# Patient Record
Sex: MALE | Race: WHITE
[De-identification: names, ages, dates, MRNs, and addresses within clinical notes are randomized per-mention and may not be internally consistent; named-entity substitution may affect disease eponyms.]

---

## 2017-09-16 ENCOUNTER — HOSPITAL ENCOUNTER (EMERGENCY)
Dept: HOSPITAL 18 - NAV ERS | Age: 82
Discharge: ANOTHER HOSPITAL | End: 2017-09-16
Payer: COMMERCIAL

## 2017-09-16 DIAGNOSIS — E11.9: ICD-10-CM

## 2017-09-16 DIAGNOSIS — R07.9: Primary | ICD-10-CM

## 2017-09-16 DIAGNOSIS — R42: ICD-10-CM

## 2017-09-16 DIAGNOSIS — Z79.899: ICD-10-CM

## 2017-09-16 DIAGNOSIS — I10: ICD-10-CM

## 2017-09-16 DIAGNOSIS — Z79.84: ICD-10-CM

## 2017-09-16 DIAGNOSIS — K21.9: ICD-10-CM

## 2017-09-16 LAB
ALBUMIN SERPL BCG-MCNC: 4.4 G/DL (ref 3.4–4.8)
ALP SERPL-CCNC: 45 U/L (ref 40–150)
ALT SERPL W P-5'-P-CCNC: 17 U/L (ref 8–55)
ANION GAP SERPL CALC-SCNC: 15 MMOL/L (ref 10–20)
AST SERPL-CCNC: 20 U/L (ref 5–34)
BASOPHILS # BLD AUTO: 0.1 THOU/UL (ref 0–0.2)
BASOPHILS NFR BLD AUTO: 1.3 % (ref 0–1)
BILIRUB SERPL-MCNC: 0.5 MG/DL (ref 0.2–1.2)
BUN SERPL-MCNC: 19 MG/DL (ref 8.4–25.7)
CALCIUM SERPL-MCNC: 10.1 MG/DL (ref 7.8–10.44)
CHLORIDE SERPL-SCNC: 104 MMOL/L (ref 98–107)
CK MB SERPL-MCNC: 2.7 NG/ML (ref 0–6.6)
CO2 SERPL-SCNC: 23 MMOL/L (ref 23–31)
CREAT CL PREDICTED SERPL C-G-VRATE: 0 ML/MIN (ref 70–130)
EOSINOPHIL # BLD AUTO: 0.3 THOU/UL (ref 0–0.7)
EOSINOPHIL NFR BLD AUTO: 6 % (ref 0–10)
GLOBULIN SER CALC-MCNC: 3.4 G/DL (ref 2.4–3.5)
GLUCOSE SERPL-MCNC: 257 MG/DL (ref 83–110)
GLUCOSE UR STRIP-MCNC: 250 MG/DL
HGB BLD-MCNC: 13.7 G/DL (ref 14–18)
LYMPHOCYTES # BLD AUTO: 0.9 THOU/UL (ref 1.2–3.4)
LYMPHOCYTES NFR BLD AUTO: 17 % (ref 21–51)
MCH RBC QN AUTO: 30.6 PG (ref 27–31)
MCV RBC AUTO: 93.6 FL (ref 80–94)
MONOCYTES # BLD AUTO: 0.4 THOU/UL (ref 0.11–0.59)
MONOCYTES NFR BLD AUTO: 7.4 % (ref 0–10)
NEUTROPHILS # BLD AUTO: 3.8 THOU/UL (ref 1.4–6.5)
NEUTROPHILS NFR BLD AUTO: 68.3 % (ref 42–75)
PLATELET # BLD AUTO: 110 THOU/UL (ref 130–400)
POTASSIUM SERPL-SCNC: 4.1 MMOL/L (ref 3.5–5.1)
RBC # BLD AUTO: 4.46 MILL/UL (ref 4.7–6.1)
RBC UR QL AUTO: (no result) HPF (ref 0–3)
SODIUM SERPL-SCNC: 138 MMOL/L (ref 136–145)
SP GR UR STRIP: 1 (ref 1–1.04)
TROPONIN I SERPL DL<=0.01 NG/ML-MCNC: 0.02 NG/ML (ref ?–0.03)
WBC # BLD AUTO: 5.5 THOU/UL (ref 4.8–10.8)
WBC UR QL AUTO: (no result) HPF (ref 0–3)

## 2017-09-16 PROCEDURE — 83605 ASSAY OF LACTIC ACID: CPT

## 2017-09-16 PROCEDURE — 81003 URINALYSIS AUTO W/O SCOPE: CPT

## 2017-09-16 PROCEDURE — 96360 HYDRATION IV INFUSION INIT: CPT

## 2017-09-16 PROCEDURE — 85025 COMPLETE CBC W/AUTO DIFF WBC: CPT

## 2017-09-16 PROCEDURE — 71010: CPT

## 2017-09-16 PROCEDURE — 80053 COMPREHEN METABOLIC PANEL: CPT

## 2017-09-16 PROCEDURE — 96361 HYDRATE IV INFUSION ADD-ON: CPT

## 2017-09-16 PROCEDURE — 81015 MICROSCOPIC EXAM OF URINE: CPT

## 2017-09-16 PROCEDURE — 93005 ELECTROCARDIOGRAM TRACING: CPT

## 2017-09-16 PROCEDURE — 70450 CT HEAD/BRAIN W/O DYE: CPT

## 2017-09-16 PROCEDURE — 84484 ASSAY OF TROPONIN QUANT: CPT

## 2017-09-16 PROCEDURE — 82553 CREATINE MB FRACTION: CPT

## 2017-09-16 NOTE — RAD
SEMIUPRIGHT FRONTAL CHEST RADIOGRAPH

9/16/17

 

COMPARISON:  

2/2/15

 

HISTORY: 

Dizziness, high blood pressure.

 

FINDINGS:  

Rim calcified structure noted in left upper quadrant, likely within the spleen, stable. No pneumotho
rax, pleural fluid, focal  consolidation, or alveolar edema. 

 

IMPRESSION:  

No acute findings. 

 

POS: [facility]

## 2017-09-16 NOTE — CT
CT OF THE HEAD WITHOUT CONTRAST:

9/16/17

 

COMPARISON:  

None.

 

HISTORY: 

Dizziness, vertigo, elevated blood pressure.

 

TECHNIQUE:  

Serial axial CT imaging at 5 mm intervals from vertex through skull base without contrast. 

 

FINDINGS:  

There is polypoid mucosal thickening involving the sphenoid sinus on the right. There is atheroscler
otic calcification of the cavernous carotid arteries and the distal left vertebral artery. No acute 
osseous abnormality is seen. 

 

There is diffuse low density within the periventricular deep and subcortical white matter suggesting
 small vessel disease. No intracranial hemorrhage, midline shift or mass effect.

 

IMPRESSION:  

1.      No intracranial hemorrhage. 

2.      Evidence of extensive small vessel disease.

3.      Polypoid density within the right sphenoid sinus. This is stable when compared to a 1/19/16 
CT examination of the neck. This could be better assessed via nonemergent brain MRI with and without
 contrast. 

 

POS: [facility]

## 2018-01-25 ENCOUNTER — HOSPITAL ENCOUNTER (OUTPATIENT)
Dept: HOSPITAL 18 - NAV CT | Age: 83
Discharge: HOME | End: 2018-01-25
Attending: INTERNAL MEDICINE
Payer: MEDICARE

## 2018-01-25 DIAGNOSIS — H60.391: Primary | ICD-10-CM

## 2018-01-25 PROCEDURE — 70480 CT ORBIT/EAR/FOSSA W/O DYE: CPT

## 2018-01-25 NOTE — CT
CT TEMPORAL BONES NONCONTRAST:

 

History: 

88-year-old male with H60.391, other infective acute otitis externa of right ear. 

 

Comparison: 

No prior temporal bone CTs are available. There is a CT of the brain of 9-16-17. 

 

FINDINGS: 

Previously, there was a large mucous retention cyst or polyp resulting in approximately 75% opacifica
tion of right sphenoid air cell. Now, the right sphenoid air cell is approximately 95% opacified. Aga
in noted is the osseous mural thickening of the right sphenoid air cell, indicating long standing, ch
ronic right sphenoid sinusitis. This is due to occlusion at the right sphenoethmoidal recess. There i
s an approximately 0.6 cm wide osseous defect at the anterior wall of the right sphenoid air cell, wh
ich may represent expansion of the right sphenoethmoidal recess by an occluding mass. The left spheno
id air cell is clear, and the left sphenoethmoidal recess is patent and clear. There is partial opaci
fication of one of the left posterior ethmoid air cells. The rest of the visualized inferior portions
 of the bilateral ethmoidal air cells are grossly clear. There is polypoid mucosal thickening causing
 mild partial opacification of the inferior aspect of right maxillary sinus. Left maxillary sinus is 
almost completely clear. 

 

Previous CT of the brain demonstrated opacification of a few of the right far inferior mastoid air ce
lls. This is again demonstrated. In addition to that, several additional right mastoid air cells are 
mildly partially opacified. There is mild soft tissue thickening lining the right external auditory c
anal. The right external auditory canal is mildly narrowed in the anteroposterior dimension. There is
 a large piece of cerumen or some other type of material within the left external auditory canal. The
 left mastoid air cells are clear. The bilateral middle ear cavities are clear. The bilateral ossicle
s have normal morphology, with no evidence of erosions or displacement. No large dehiscense of the te
gmen tympani or tegmen mastoideum identified bilaterally. No morphologic abnormality identified invol
ving the internal auditory canals, cochleae, vestibules, vestibular aqueducts, semicircular canals, c
arotid canals, or jugular bulbs. 

 

IMPRESSION: 

1. Mild soft tissue thickening of the right external auditory canal, consistent with the stated histo
ry of right otitis externa. 

2. Small right mastoid effusion. 

3. Large piece of cerumen in the contralateral left external auditory canal. 

4. No osseous destruction. 

5. Bilateral middle ear cavities and mastoid antra are clear. 

6. Otherwise, no abnormality of the temporal bone structures identified. 

7. Interval progression of chronic right sphenoid occlusive sinus disease, with occluding lesion expa
nding the right sphenoethmoidal recess. Recommend otolaryngology consultation. 

 

POS: [facility]

## 2018-12-05 ENCOUNTER — HOSPITAL ENCOUNTER (OUTPATIENT)
Dept: HOSPITAL 92 - ERS | Age: 83
Setting detail: OBSERVATION
LOS: 1 days | Discharge: HOME | End: 2018-12-06
Attending: FAMILY MEDICINE | Admitting: FAMILY MEDICINE
Payer: MEDICARE

## 2018-12-05 ENCOUNTER — HOSPITAL ENCOUNTER (EMERGENCY)
Dept: HOSPITAL 18 - NAV ERS | Age: 83
Discharge: ANOTHER HOSPITAL | End: 2018-12-05
Payer: MEDICARE

## 2018-12-05 VITALS — BODY MASS INDEX: 26.8 KG/M2

## 2018-12-05 DIAGNOSIS — M19.90: ICD-10-CM

## 2018-12-05 DIAGNOSIS — I10: ICD-10-CM

## 2018-12-05 DIAGNOSIS — Z79.891: ICD-10-CM

## 2018-12-05 DIAGNOSIS — Z79.4: ICD-10-CM

## 2018-12-05 DIAGNOSIS — R07.89: Primary | ICD-10-CM

## 2018-12-05 DIAGNOSIS — Z98.890: ICD-10-CM

## 2018-12-05 DIAGNOSIS — Z79.899: ICD-10-CM

## 2018-12-05 DIAGNOSIS — M54.9: ICD-10-CM

## 2018-12-05 DIAGNOSIS — K21.9: ICD-10-CM

## 2018-12-05 DIAGNOSIS — Z85.46: ICD-10-CM

## 2018-12-05 DIAGNOSIS — E11.9: ICD-10-CM

## 2018-12-05 DIAGNOSIS — R07.2: Primary | ICD-10-CM

## 2018-12-05 DIAGNOSIS — N40.0: ICD-10-CM

## 2018-12-05 LAB
ALBUMIN SERPL BCG-MCNC: 4.4 G/DL (ref 3.4–4.8)
ALP SERPL-CCNC: 44 U/L (ref 40–150)
ALT SERPL W P-5'-P-CCNC: 17 U/L (ref 8–55)
ANION GAP SERPL CALC-SCNC: 15 MMOL/L (ref 10–20)
AST SERPL-CCNC: 21 U/L (ref 5–34)
BASOPHILS # BLD AUTO: 0 THOU/UL (ref 0–0.2)
BASOPHILS NFR BLD AUTO: 0.8 % (ref 0–1)
BILIRUB SERPL-MCNC: 0.6 MG/DL (ref 0.2–1.2)
BUN SERPL-MCNC: 24 MG/DL (ref 8.4–25.7)
CALCIUM SERPL-MCNC: 10.5 MG/DL (ref 7.8–10.44)
CHLORIDE SERPL-SCNC: 102 MMOL/L (ref 98–107)
CO2 SERPL-SCNC: 25 MMOL/L (ref 23–31)
CREAT CL PREDICTED SERPL C-G-VRATE: 0 ML/MIN (ref 70–130)
EOSINOPHIL # BLD AUTO: 0.2 THOU/UL (ref 0–0.7)
EOSINOPHIL NFR BLD AUTO: 4.7 % (ref 0–10)
GLOBULIN SER CALC-MCNC: 2.9 G/DL (ref 2.4–3.5)
GLUCOSE SERPL-MCNC: 207 MG/DL (ref 83–110)
HGB BLD-MCNC: 12.7 G/DL (ref 14–18)
LYMPHOCYTES # BLD AUTO: 0.9 THOU/UL (ref 1.2–3.4)
LYMPHOCYTES NFR BLD AUTO: 17.6 % (ref 21–51)
MCH RBC QN AUTO: 31.2 PG (ref 27–31)
MCV RBC AUTO: 95.8 FL (ref 78–98)
MONOCYTES # BLD AUTO: 0.4 THOU/UL (ref 0.11–0.59)
MONOCYTES NFR BLD AUTO: 8 % (ref 0–10)
NEUTROPHILS # BLD AUTO: 3.5 THOU/UL (ref 1.4–6.5)
NEUTROPHILS NFR BLD AUTO: 68.8 % (ref 42–75)
PLATELET # BLD AUTO: 112 THOU/UL (ref 130–400)
PLATELET BLD QL SMEAR: (no result)
POTASSIUM SERPL-SCNC: 4.3 MMOL/L (ref 3.5–5.1)
RBC # BLD AUTO: 4.08 MILL/UL (ref 4.7–6.1)
SODIUM SERPL-SCNC: 138 MMOL/L (ref 136–145)
TROPONIN I SERPL DL<=0.01 NG/ML-MCNC: (no result) NG/ML (ref ?–0.03)
TROPONIN I SERPL DL<=0.01 NG/ML-MCNC: (no result) NG/ML (ref ?–0.03)
WBC # BLD AUTO: 5 THOU/UL (ref 4.8–10.8)

## 2018-12-05 PROCEDURE — 99285 EMERGENCY DEPT VISIT HI MDM: CPT

## 2018-12-05 PROCEDURE — 93005 ELECTROCARDIOGRAM TRACING: CPT

## 2018-12-05 PROCEDURE — 36416 COLLJ CAPILLARY BLOOD SPEC: CPT

## 2018-12-05 PROCEDURE — 80053 COMPREHEN METABOLIC PANEL: CPT

## 2018-12-05 PROCEDURE — 84484 ASSAY OF TROPONIN QUANT: CPT

## 2018-12-05 PROCEDURE — 36415 COLL VENOUS BLD VENIPUNCTURE: CPT

## 2018-12-05 PROCEDURE — 85025 COMPLETE CBC W/AUTO DIFF WBC: CPT

## 2018-12-05 PROCEDURE — 93017 CV STRESS TEST TRACING ONLY: CPT

## 2018-12-05 PROCEDURE — 94760 N-INVAS EAR/PLS OXIMETRY 1: CPT

## 2018-12-05 PROCEDURE — G0378 HOSPITAL OBSERVATION PER HR: HCPCS

## 2018-12-05 PROCEDURE — A9500 TC99M SESTAMIBI: HCPCS

## 2018-12-05 PROCEDURE — 83880 ASSAY OF NATRIURETIC PEPTIDE: CPT

## 2018-12-05 PROCEDURE — 82962 GLUCOSE BLOOD TEST: CPT

## 2018-12-05 PROCEDURE — 71045 X-RAY EXAM CHEST 1 VIEW: CPT

## 2018-12-05 PROCEDURE — 78452 HT MUSCLE IMAGE SPECT MULT: CPT

## 2018-12-05 RX ADMIN — ACETAMINOPHEN AND CODEINE PHOSPHATE SCH TAB: 300; 30 TABLET ORAL at 17:09

## 2018-12-05 NOTE — PDOC.FPRHP
- History of Present Illness


Chief Complaint: Chest pain


History of Present Illness: 





This is an 88 yo male with a PMH of HTN, DM2, and GERD who presents to the ED 

from an outside ED with a cc of chest pain. He states that he has been having a 

pressure on his chest during the night for the last 5 nights. Two of the night, 

including last night, he reports the pain did not go away on its own. He states 

that last night the pain started at about 1800 and continued. He reports 

feeling hot all over during these episodes. He denies associated SOB, nausea, 

diaphoresis, or dizziness. He denies radiation of the pain and states that 

currently he is not in pain.





He reports recently starting bystolic per his PCP for his HTN. He reports very 

elevated blood pressures in the past month, however when he checked his BP at 

home during these episodes, his BP or pulse were not too high.


ED Course: 





Aspirin 324 mg





- Allergies/Adverse Reactions


 Allergies











Allergy/AdvReac Type Severity Reaction Status Date / Time


 


No Known Drug Allergies Allergy   Verified 12/05/18 14:27














- Home Medications


 











 Medication  Instructions  Recorded  Confirmed  Type


 


Acetaminophen With Codeine 600 mg PO BID 09/17/17 12/05/18 History





[Tylenol with Codeine #3]    


 


Doxazosin Mesylate [Cardura] 8 mg PO DAILY 09/17/17 12/05/18 History


 


Esomeprazole Magnesium [NexIUM] 20 mg PO DAILY 09/17/17 12/05/18 History


 


amLODIPine Besylate [Norvasc] 10 mg PO HS 09/17/17 12/05/18 History


 


metFORMIN [Glucophage] 500 mg PO DAILY 09/17/17 12/05/18 History


 


Insulin Aspart [Novolog Flexpen] 8 unit SQ TID 12/05/18 12/05/18 History


 


Nebivolol HCl [Bystolic] 5 mg PO HS 12/05/18 12/05/18 History


 


Tamsulosin HCl [Flomax] 0.4 mg PO HS 12/05/18 12/05/18 History














- History


PMHx: HTN, GERD, DM2, Osteoarthritis, hx of prostate cancer, BPH, chronic back 

pain


 


PSHx: Prostate surgery in 2007





FHx: Noncontributory


 


Social: Denies T/D, occasional >monthly alcohol use


 








- Review of Systems


General: denies: fever/chills, weight/appetite/sleep changes, night sweats


Eyes: denies: eye pain


ENT: denies: nasal congestion, rhinorrhea


Respiratory: denies: cough, congestion, shortness of breath


Cardiovascular: reports: chest pain.  denies: palpitation, edema


Gastrointestinal: denies: nausea, vomiting, diarrhea, constipation


Skin: denies: rashes, lesions


Musculoskeletal: reports: pain (back pain)


Neurological: denies: numbness, syncope, weakness


Psychological: denies: anxiety, depression





- Vital signs


BP: 149/100  HR: 82 RR: 16 Tmax: 98.1 Pox: 98% on RA  Wt: 86.18 kg   








- Physical Exam


Constitutional: NAD, awake, alert and oriented, well developed


HEENT: normocephalic and atraumatic, EOMI, MMM


Neck: supple, FROM, trachea midline, no JVD


Chest: no-tender to palpation, no lesions


Heart: RRR, normal S1/S2, other (2/6 systolic murmur)


Lungs: CTAB, no respiratory distress, good air movement


Abdomen: soft, non-tender, bowel sounds present, no masses/distention


Musculoskeletal: normal structure, normal tone, ROM grossly normal


Neurological: no focal deficit, CN II-XII intact


Skin: no rash/lesions, capillary refill <2 seconds


Heme/Lymphatic: no unusual bruising or bleeding, no purpura


Psychiatric: normal mood and affect





FMR H&P: Results





- Labs


Lab results: 





CBC 


WBC 5.0  


Hgb 12.7


Hct 39.1


Plt 112


MCV 95.8


CMP 


Na 138


K 4.3


Cl 102


Bicarb 25 


BUN 24


Cr 1.00


GFR 70





ALT 21


AST 17


Bili 0.6


Albumin 4.4 


Lactic acid 1.2








- EKG Interpretation


EKG: 





sinus rhythm, PVC, RBBB (new from previous EKG)





- Radiology Interpretation


  ** Chest x-ray


Status: image reviewed by me, report reviewed by me (No acute cardiopulmonary 

processes.)





FMR H&P: A/P





- Problem List


(1) Atypical chest pain


Current Visit: Yes   Status: Acute   Code(s): R07.89 - OTHER CHEST PAIN   





(2) History of prostate cancer


Current Visit: Yes   Status: Acute   Code(s): Z85.46 - PERSONAL HISTORY OF 

MALIGNANT NEOPLASM OF PROSTATE   





(3) Chronic back pain


Current Visit: Yes   Status: Acute   Code(s): M54.9 - DORSALGIA, UNSPECIFIED; 

G89.29 - OTHER CHRONIC PAIN   





(4) DM type 2 (diabetes mellitus, type 2)


Current Visit: No   Status: Chronic   


Qualifiers: 


   Diabetes mellitus long term insulin use: with long term use   Diabetes 

mellitus complication status: with unspecified complications   Qualified Code(s)

: E11.8 - Type 2 diabetes mellitus with unspecified complications; Z79.4 - Long 

term (current) use of insulin   





(5) GERD (gastroesophageal reflux disease)


Current Visit: No   Status: Chronic   Code(s): K21.9 - GASTRO-ESOPHAGEAL REFLUX 

DISEASE WITHOUT ESOPHAGITIS   


Qualifiers: 


   Esophagitis presence: esophagitis presence not specified   Qualified Code(s)

: K21.9 - Gastro-esophageal reflux disease without esophagitis   





(6) HTN (hypertension)


Current Visit: No   Status: Chronic   Code(s): I10 - ESSENTIAL (PRIMARY) 

HYPERTENSION   


Qualifiers: 


   Hypertension type: essential hypertension   Qualified Code(s): I10 - 

Essential (primary) hypertension   





- Plan





This is a 88 yo male with a pmh HTN, DM2, GERD, osteoarthritis





Atypical chest pain stable angina vs GERD


-Admit to tele obs


-Trop negative x2


-CXR negative for acute cardiopulmonary process


-EKG shows new RBBB and a PVC


-Pt. has received aspirin


-Heart score of 5


-Pt will receive treadmill cardiolite stress test and pt. will likely be 

discharged if normal


-PRN nitro





HTN


-Holding home medications for stress test





GERD


-Continue home nexium





DM2


-Accuchecks and SSI


-Continue home metformin





Osteoarthritis


-Continue home meds





Chronic back pain


-Continue home meds





Hx of prostate cancer








Code: Full


Prophylaxis: none


Family: none at bedside


Disposition: home if normal stress test





FMR H&P: [place]





- Pertinent history








89M presenting to outside ED for one week history of CP. Patient denies actual 

pain- describes it as a burning sensation that begins in his stomach and 

radiates up his chest. He feels a warmth across his entire chest when he lays 

down at night. Symptoms only occur when he lays down. History significant for 

HTN, DMII, and GERD. He denies associated SOB, nausea, diaphoresis, or 

dizziness. He denies radiation of the pain and states that currently he is not 

in pain. He recently started Bystolic three days ago.





- Pertinent findings





CXR: no consolidations


trop: negative





- Plan


Date/Time: 12/05/18 0942








Atypical chest pain, likely GERD: admit tele/obs. Trend troponins. Cardiology 

has been consulted from the ED. EKG concerning for a new RBBB. Patient is 

asymptomatic currently, but will have prn morphine, nitro, and oxygen 

available. Heart score of 5 so he will undergo stress test. GI cocktail 

available. 








I, Timoteo Perez, have evaluated this patient and agree with findings/plan as 

outlined by intern resident. Pertinent changes/additions are listed here.

## 2018-12-06 VITALS — SYSTOLIC BLOOD PRESSURE: 138 MMHG | DIASTOLIC BLOOD PRESSURE: 84 MMHG | TEMPERATURE: 98.1 F

## 2018-12-06 RX ADMIN — ACETAMINOPHEN AND CODEINE PHOSPHATE SCH TAB: 300; 30 TABLET ORAL at 11:32

## 2018-12-06 RX ADMIN — ACETAMINOPHEN AND CODEINE PHOSPHATE SCH: 300; 30 TABLET ORAL at 09:20

## 2018-12-06 NOTE — NM
MYOCARDIAL PERFUSION STUDY:

 

DATE:  12/06/18

 

HISTORY:  

Chest pain. 

 

RADIOPHARMACEUTICALS:

31.5 mCi technetium-99m sestamibi, IV at stress. 

9.9 mCi technetium-99m sestamibi, IV at rest. 

 

This examination was performed as an exercise stress myocardial perfusion study following [place] [facility] [place]. Resting hear rate is 97 beats/minute with maximal heart rate of 169 beats/minute, which 
represents 129% of the maximum predictive effective heart rate. 

 

FINDINGS:

There is no significant reversible defects are seen between the stress and resting acquisitions. The 
quantitative analysis also shows no significant reversible defect. Gated images show normal ventricul
ar wall motion and wall thickening. The calculated left ventricular ejection fraction is 51%. 

 

IMPRESSION: 

1.  Normal myocardial perfusion study without evidence of a reversible defect seen to suggest ischemi
a.  

 

2.  Left ventricular ejection fraction of 51%, which is diminished from 67% on prior study on 02/15/0
5. 

 

 

 

POS: [facility]

## 2018-12-06 NOTE — PDOC.FM
- Subjective


Subjective: 





Pt states that he did not sleep as well overnight due to a different 

environment. He states that his same chest pain happened last night however it 

happened at ~3 in the morning. He believes this is due to his BPH meds being 

given 2 hours later. At the time of that pain, his sugar was 100 points lower. 

He denies any SOB, diaphoresis, or nausea.





- Objective


MAR Reviewed: Yes


Vital Signs & Weight: 


 Vital Signs (12 hours)











  Temp Pulse Resp BP Pulse Ox


 


 12/06/18 02:34  97.8 F  92  20  162/74 H  94 L


 


 12/05/18 23:10  97.8 F  88  16  162/82 H  95


 


 12/05/18 19:25  98.1 F  90  20  148/75 H  94 L








 Weight











Weight                         89.811 kg














I&O: 


 











 12/04/18 12/05/18 12/06/18





 06:59 06:59 06:59


 


Intake Total   360


 


Output Total   350


 


Balance   10














<Drew Gloria - Last Filed: 12/06/18 08:15>





- Objective


Vital Signs & Weight: 


 Vital Signs (12 hours)











  Temp Pulse Resp BP BP Pulse Ox


 


 12/06/18 11:29  97.7 F  111 H  16   181/90 H  98


 


 12/06/18 07:39  97.9 F  96  16   166/91 H  96


 


 12/06/18 02:34  97.8 F  92  20  162/74 H   94 L








 Weight











Weight                         89.811 kg














I&O: 


 











 12/05/18 12/06/18 12/07/18





 06:59 06:59 06:59


 


Intake Total  720 


 


Output Total  500 


 


Balance  220 














<Rhett Bucio - Last Filed: 12/06/18 12:58>





Phys Exam





- Physical Examination


Constitutional: NAD


HEENT: moist MMs


Neck: no JVD


Respiratory: no wheezing, clear to auscultation bilateral


Cardiovascular: RRR


2/6 systolic murmur


Gastrointestinal: soft, non-tender, no distention, positive bowel sounds


Musculoskeletal: no edema, pulses present


Neurological: moves all 4 limbs


Psychiatric: normal affect, A&O x 3


Skin: cap refill <2 seconds





<Drew Gloria - Last Filed: 12/06/18 08:15>





Dx/Plan


(1) Atypical chest pain


Code(s): R07.89 - OTHER CHEST PAIN   Status: Acute   





(2) History of prostate cancer


Code(s): Z85.46 - PERSONAL HISTORY OF MALIGNANT NEOPLASM OF PROSTATE   Status: 

Acute   





(3) Chronic back pain


Code(s): M54.9 - DORSALGIA, UNSPECIFIED; G89.29 - OTHER CHRONIC PAIN   Status: 

Acute   





(4) DM type 2 (diabetes mellitus, type 2)


Status: Chronic   


Qualifiers: 


   Diabetes mellitus long term insulin use: with long term use   Diabetes 

mellitus complication status: with unspecified complications   Qualified Code(s)

: E11.8 - Type 2 diabetes mellitus with unspecified complications; Z79.4 - Long 

term (current) use of insulin   





(5) GERD (gastroesophageal reflux disease)


Code(s): K21.9 - GASTRO-ESOPHAGEAL REFLUX DISEASE WITHOUT ESOPHAGITIS   Status: 

Chronic   


Qualifiers: 


   Esophagitis presence: esophagitis presence not specified   Qualified Code(s)

: K21.9 - Gastro-esophageal reflux disease without esophagitis   





(6) HTN (hypertension)


Code(s): I10 - ESSENTIAL (PRIMARY) HYPERTENSION   Status: Chronic   


Qualifiers: 


   Hypertension type: essential hypertension   Qualified Code(s): I10 - 

Essential (primary) hypertension   





- Plan


Plan: 





This is a 89 yo male with a pmh HTN, DM2, GERD, osteoarthritis





Atypical chest pain stable angina vs GERD


-Admit to tele obs


-Trop negative x3


-CXR negative for acute cardiopulmonary process


-EKG shows new RBBB and a PVC


-Pt. has received aspirin


-Heart score of 5


-Pt will receive treadmill cardiolite stress test and pt. will likely be 

discharged if normal


-PRN nitro





HTN


-Holding home medications for stress test





GERD


-Continue home nexium





DM2


-Accuchecks and SSI


-Continue home metformin





Osteoarthritis


-Continue home meds





Chronic back pain


-Continue home meds





Hx of prostate cancer





<Drew Gloria - Last Filed: 12/06/18 08:15>





Attending Addendum





- Attending Addendum


Date/Time: 12/06/18 9393





I personally evaluated the patient and discussed the management with Dr. Gloria.


I agree with the History, Examination, Assessment and Plan documented above 

with any addition or exceptions noted below.


Pain free now.  Vitals stable.  Lungs CTA.  Completed Stress cardiolyte, 

awaiting results.  Pt. had recurrence of symptoms overnight, thought it might 

be temporally related to Prostate meds administered off usual time, but this 

seems unlikely as he's taken these for years.  Glucoses vary fairly widely, so 

this may also be a factor.  No sánchez hypoglycemia but does swing from one 

measurement to another.  If cardiolyte is normal, can be released to f/u with 

PCP. 





<Rhett Bucio - Last Filed: 12/06/18 12:58>

## 2018-12-06 NOTE — CON
DATE OF CONSULTATION:  12/05/2018



REASON FOR CONSULTATION:  Burning in his chest.



HISTORY OF PRESENT ILLNESS:  Mr. Nader Graham is a delightful 89-year-old

gentleman.  He has a history of esophageal reflux.  He says recently he has been

having increasing muscle burning in his chest despite taking what he thinks is

Prilosec 20 mg a day.  He came to the hospital where he has been admitted for

evaluation in view of increased risk factors for coronary disease. 



He does not report any exertional symptoms.



PAST MEDICAL HISTORY:  

1. He has a long history of diabetes.

2. Esophageal reflux.

3. Hypertension.



MEDICATIONS:  Prior to admission;

1. Doxazosin.

2. Insulin.

3. Recently started on Bystolic.

4. Metformin.

5. Amlodipine.

6. Tamsulosin.



SOCIAL HISTORY:  No alcohol or tobacco.



REVIEW OF SYSTEMS:  CONSTITUTIONAL:  No significant weight gain or loss. 

VISION:  No changes. 

HEARING:  No changes. 

PULMONARY:  No cough or wheezing. 

GASTROINTESTINAL:  No nausea, vomiting or diarrhea. 

SKIN:  No rashes. 

NEUROLOGIC:  No unilateral weakness or numbness. 

PSYCHIATRIC:  No unusual depression or anxiety. 

HEMATOLOGIC:  No unusual bruising. 

GENITOURINARY:  No burning with urination. 

MUSCULOSKELETAL:  No unusual joint pains.



PHYSICAL EXAMINATION:

GENERAL:  This is a delightful elderly gentleman, in no distress. 

VITAL SIGNS:  Blood pressure 157/74, pulse 90, it is regular. 

EYES:  Sclerae nonicteric. 

MOUTH:  Mucous membranes were moist. 

NECK:  Supple.  No lymphadenopathy. 

LUNGS:  Clear.  No wheezing, rales, or rhonchi. 

CARDIAC:  Normal S1, normal S2.  There is a 2/6 systolic murmur, right upper sternal

border.  No diastolic murmur.  No S3. 

ABDOMEN:  Soft, nontender. 

EXTREMITIES:  No clubbing, cyanosis, or edema. 

SKIN:  Warm and dry. 

PSYCHIATRIC:  Mood and affect normal. 

NEUROLOGIC:  Grossly normal. 

PULSES:  Good posterior tibial and dorsalis pedis pulse bilaterally.



LABORATORY DATA:  Pertinent laboratory, troponin levels were all negative.  The EKG

did not show any ischemic changes.  Rhythm strip showed occasional PVCs. 



ASSESSMENT:  Chest discomfort suspicious for esophageal reflux.



PLAN:  Stress test to be done tomorrow.  He has a risk factor for coronary disease

as mentioned.  Most recent LDL is 72. 







Job ID:  [id number]

## 2018-12-08 NOTE — EKG
Test Reason : CP

Blood Pressure : ***/*** mmHG

Vent. Rate : 082 BPM     Atrial Rate : 082 BPM

   P-R Int : 182 ms          QRS Dur : 128 ms

    QT Int : 388 ms       P-R-T Axes : 060 063 030 degrees

   QTc Int : 453 ms

 

Sinus rhythm with occasional Premature ventricular complexes

Possible Left atrial enlargement

Right bundle branch block

Abnormal ECG

 

Confirmed by SEE TRUPIN DO ([id number]),  RADHA CERDA ([id number]) on 12/8/2018 1:47:29 PM

 

Referred By:             Confirmed By:SEE TURPIN DO

## 2018-12-12 NOTE — STRESS
Acquisition Time: 2018-12-06  09:49:37

Total Exercise Time: 00:06:01

Test Indications: CHEST PAIN

Medications: 

 

 

 

 

 

 

 

 

 

Protocol:     CARLOS

Max HR: 169 BPM  129% of  Pred: 131 BPM

Max BP: 168/098 mmHG

Max Work Load: 7.0 METS

 

RESTING ECG: NORMAL SINUS RHYTHM AT 98 BPM WITH INCOMPLETE RIGHT BUNDLE BRANCH

 BLOCK

SYMPTOMS: DYSPNEA ON EXERTION

NORMAL BP RESPONSE

ECG STRESS: NO SIGNIFICANT CHANGES

INTERPRETATION: NEGATIVE GXT / AWAIT NUCLEAR IMAGES FOR DEFINITIVE DIAGNOSIS

COMMENTS: TRIGEMINY IN RECOVERY-RESOLVED IN 6 MIN / INJETED WITH CARDIOLITE AT

 5:00

 

Confirmed by LYNNE CLINE M.D. ([id number]) on 12/12/2018 5:42:21 PM

 

Referred By: DO IBARRA           Confirmed By:LYNNE CLINE M.D.